# Patient Record
Sex: MALE | Race: WHITE | HISPANIC OR LATINO | URBAN - METROPOLITAN AREA
[De-identification: names, ages, dates, MRNs, and addresses within clinical notes are randomized per-mention and may not be internally consistent; named-entity substitution may affect disease eponyms.]

---

## 2020-04-08 ENCOUNTER — EMERGENCY (EMERGENCY)
Facility: HOSPITAL | Age: 55
LOS: 1 days | Discharge: HOME | End: 2020-04-08
Attending: EMERGENCY MEDICINE | Admitting: EMERGENCY MEDICINE
Payer: MEDICAID

## 2020-04-08 VITALS
DIASTOLIC BLOOD PRESSURE: 78 MMHG | SYSTOLIC BLOOD PRESSURE: 116 MMHG | RESPIRATION RATE: 20 BRPM | TEMPERATURE: 103 F | WEIGHT: 259.93 LBS | HEART RATE: 128 BPM | OXYGEN SATURATION: 93 % | HEIGHT: 71 IN

## 2020-04-08 VITALS
RESPIRATION RATE: 20 BRPM | SYSTOLIC BLOOD PRESSURE: 115 MMHG | OXYGEN SATURATION: 95 % | DIASTOLIC BLOOD PRESSURE: 80 MMHG | TEMPERATURE: 99 F | HEART RATE: 104 BPM

## 2020-04-08 LAB
ALBUMIN SERPL ELPH-MCNC: 3.4 G/DL — SIGNIFICANT CHANGE UP (ref 3.3–5)
ALP SERPL-CCNC: 42 U/L — SIGNIFICANT CHANGE UP (ref 40–120)
ALT FLD-CCNC: 116 U/L — HIGH (ref 12–78)
ANION GAP SERPL CALC-SCNC: 8 MMOL/L — SIGNIFICANT CHANGE UP (ref 5–17)
AST SERPL-CCNC: 94 U/L — HIGH (ref 15–37)
BASOPHILS # BLD AUTO: 0.01 K/UL — SIGNIFICANT CHANGE UP (ref 0–0.2)
BASOPHILS NFR BLD AUTO: 0.1 % — SIGNIFICANT CHANGE UP (ref 0–2)
BILIRUB SERPL-MCNC: 0.4 MG/DL — SIGNIFICANT CHANGE UP (ref 0.2–1.2)
BUN SERPL-MCNC: 15 MG/DL — SIGNIFICANT CHANGE UP (ref 7–23)
CALCIUM SERPL-MCNC: 9.3 MG/DL — SIGNIFICANT CHANGE UP (ref 8.5–10.1)
CHLORIDE SERPL-SCNC: 98 MMOL/L — SIGNIFICANT CHANGE UP (ref 96–108)
CO2 SERPL-SCNC: 28 MMOL/L — SIGNIFICANT CHANGE UP (ref 22–31)
CREAT SERPL-MCNC: 1.1 MG/DL — SIGNIFICANT CHANGE UP (ref 0.5–1.3)
EOSINOPHIL # BLD AUTO: 0 K/UL — SIGNIFICANT CHANGE UP (ref 0–0.5)
EOSINOPHIL NFR BLD AUTO: 0 % — SIGNIFICANT CHANGE UP (ref 0–6)
GLUCOSE SERPL-MCNC: 110 MG/DL — HIGH (ref 70–99)
HCT VFR BLD CALC: 44.8 % — SIGNIFICANT CHANGE UP (ref 39–50)
HGB BLD-MCNC: 15.7 G/DL — SIGNIFICANT CHANGE UP (ref 13–17)
IMM GRANULOCYTES NFR BLD AUTO: 0.3 % — SIGNIFICANT CHANGE UP (ref 0–1.5)
LYMPHOCYTES # BLD AUTO: 1.41 K/UL — SIGNIFICANT CHANGE UP (ref 1–3.3)
LYMPHOCYTES # BLD AUTO: 21 % — SIGNIFICANT CHANGE UP (ref 13–44)
MCHC RBC-ENTMCNC: 31 PG — SIGNIFICANT CHANGE UP (ref 27–34)
MCHC RBC-ENTMCNC: 35 GM/DL — SIGNIFICANT CHANGE UP (ref 32–36)
MCV RBC AUTO: 88.5 FL — SIGNIFICANT CHANGE UP (ref 80–100)
MONOCYTES # BLD AUTO: 0.57 K/UL — SIGNIFICANT CHANGE UP (ref 0–0.9)
MONOCYTES NFR BLD AUTO: 8.5 % — SIGNIFICANT CHANGE UP (ref 2–14)
NEUTROPHILS # BLD AUTO: 4.71 K/UL — SIGNIFICANT CHANGE UP (ref 1.8–7.4)
NEUTROPHILS NFR BLD AUTO: 70.1 % — SIGNIFICANT CHANGE UP (ref 43–77)
NRBC # BLD: 0 /100 WBCS — SIGNIFICANT CHANGE UP (ref 0–0)
PLATELET # BLD AUTO: 250 K/UL — SIGNIFICANT CHANGE UP (ref 150–400)
POTASSIUM SERPL-MCNC: 3.5 MMOL/L — SIGNIFICANT CHANGE UP (ref 3.5–5.3)
POTASSIUM SERPL-SCNC: 3.5 MMOL/L — SIGNIFICANT CHANGE UP (ref 3.5–5.3)
PROT SERPL-MCNC: 7.6 G/DL — SIGNIFICANT CHANGE UP (ref 6–8.3)
RBC # BLD: 5.06 M/UL — SIGNIFICANT CHANGE UP (ref 4.2–5.8)
RBC # FLD: 13.6 % — SIGNIFICANT CHANGE UP (ref 10.3–14.5)
SODIUM SERPL-SCNC: 134 MMOL/L — LOW (ref 135–145)
WBC # BLD: 6.72 K/UL — SIGNIFICANT CHANGE UP (ref 3.8–10.5)
WBC # FLD AUTO: 6.72 K/UL — SIGNIFICANT CHANGE UP (ref 3.8–10.5)

## 2020-04-08 PROCEDURE — 71045 X-RAY EXAM CHEST 1 VIEW: CPT | Mod: 26

## 2020-04-08 PROCEDURE — 85027 COMPLETE CBC AUTOMATED: CPT

## 2020-04-08 PROCEDURE — 36415 COLL VENOUS BLD VENIPUNCTURE: CPT

## 2020-04-08 PROCEDURE — 80053 COMPREHEN METABOLIC PANEL: CPT

## 2020-04-08 PROCEDURE — 87635 SARS-COV-2 COVID-19 AMP PRB: CPT

## 2020-04-08 PROCEDURE — 99283 EMERGENCY DEPT VISIT LOW MDM: CPT

## 2020-04-08 PROCEDURE — 71045 X-RAY EXAM CHEST 1 VIEW: CPT

## 2020-04-08 RX ORDER — SODIUM CHLORIDE 9 MG/ML
1000 INJECTION INTRAMUSCULAR; INTRAVENOUS; SUBCUTANEOUS ONCE
Refills: 0 | Status: COMPLETED | OUTPATIENT
Start: 2020-04-08 | End: 2020-04-08

## 2020-04-08 RX ORDER — ACETAMINOPHEN 500 MG
650 TABLET ORAL ONCE
Refills: 0 | Status: COMPLETED | OUTPATIENT
Start: 2020-04-08 | End: 2020-04-08

## 2020-04-08 RX ADMIN — SODIUM CHLORIDE 1000 MILLILITER(S): 9 INJECTION INTRAMUSCULAR; INTRAVENOUS; SUBCUTANEOUS at 20:23

## 2020-04-08 RX ADMIN — Medication 650 MILLIGRAM(S): at 20:23

## 2020-04-08 NOTE — ED ADULT TRIAGE NOTE - CHIEF COMPLAINT QUOTE
patient came in ED from home with c/o fever, cough, headache, shortness of breath X 12 days. patient stated he completed Z-pack, medrol pack, cough ,medicine X 5 days, no relief.

## 2020-04-08 NOTE — ED PROVIDER NOTE - OBJECTIVE STATEMENT
56 yo male hx of hld, htn c/o fever, cough, headache sob x 2 weeks, taking tylenol, but feels worse, no covid test done, Tmax 103.  No n/v/d. No abdominal pain.  Finished z-pack and medrol dose pack. 56 yo male hx of hld, htn c/o fever, cough, headache sob x 2 weeks, taking tylenol, but feels worse, no covid test done, Tmax 103.  No n/v/d. No abdominal pain.  Finished z-pack and medrol dose pack.    pmd Dr. Dey from [facility]

## 2020-04-08 NOTE — ED ADULT NURSE NOTE - NSIMPLEMENTINTERV_GEN_ALL_ED
Implemented All Universal Safety Interventions:  [place] to call system. Call bell, personal items and telephone within reach. Instruct patient to call for assistance. Room bathroom lighting operational. Non-slip footwear when patient is off stretcher. Physically safe environment: no spills, clutter or unnecessary equipment. Stretcher in lowest position, wheels locked, appropriate side rails in place.

## 2020-04-08 NOTE — ED PROVIDER NOTE - PROGRESS NOTE DETAILS
feels much better, oxygen sat 95% on RA, understands to return if any worsening symptoms.  self isolate until 72 hours after resolution of symptoms

## 2020-04-08 NOTE — ED PROVIDER NOTE - NS ED ROS FT
Constitutional: + Fever, - Chills, - Anorexia, - Fatigue, - Night sweats  Eyes: - Discharge, - Irritation, - Redness, - Visual changes, - Light sensitivity, - Pain  EARS: - Ear Pain, - Tinnitus, - Decreased hearing  NOSE: - Congestion, - Bloody nose  MOUTH/THROAT: - Vocal Changes, - Drooling, - Sore throat  NECK: - Lumps, - Stiffness, - Pain  CV: - Palpitations, - Chest Pain, - Edema, - Syncope  RESP:  +Cough, +Shortness of Breath, - Dyspnea on Exertion, - Trouble speaking, - Pleuritic pain - Wheezing  GI: - Diarrhea, - Constipation, - Bloody stools, - Nausea, - Vomiting, - Abdominal Pain  : - Dysuria, -Frequency, - Hematuria, - Hesitancy, - Incontinence, - Saddle Anesthesia, - Abnormal discharge  MSK: - Myalgias, - Arthralgias, - Weakness, - Deformities, - Injuries  SKIN: - Color change, - Rash, - Swelling, - Ecchymosis, - Abrasion, - laceration  NEURO: - Change in behavior, - Dec. Alertness, - Headache, - Dizziness, - Change in speech, - Weakness, - Seizure-like activity, - Difficulty ambulating

## 2020-04-08 NOTE — ED ADULT NURSE NOTE - OBJECTIVE STATEMENT
fever cough x 14 weeks. states he compeleted course of abx and no improvement. arrives a& ox 3, mildly tachpneic. Spo2 93-95 on room air.

## 2020-04-08 NOTE — ED PROVIDER NOTE - PATIENT PORTAL LINK FT
You can access the FollowMyHealth Patient Portal offered by [facility] by registering at the following website: [email or web address]. By joining [employer]’s FollowMyHealth portal, you will also be able to view your health information using other applications (apps) compatible with our system.

## 2020-04-08 NOTE — ED PROVIDER NOTE - NSFOLLOWUPINSTRUCTIONS_ED_ALL_ED_FT
1) Follow-up with your Primary Medical Doctor or referred doctor.   2) Return if any weakness, fever, or any other concerning/worsening symptoms.  3) See attached instruction sheets for additional information, including information regarding signs and symptoms to look out for, reasons to seek immediate care and other important instructions.  4) Take doxycycline 100mg twice a day for 7 days  5) Tylenol 650mg every 6 hours as needed for fever

## 2020-04-09 LAB — SARS-COV-2 RNA SPEC QL NAA+PROBE: DETECTED

## 2020-05-01 ENCOUNTER — OUTPATIENT (OUTPATIENT)
Dept: OUTPATIENT SERVICES | Facility: HOSPITAL | Age: 55
LOS: 1 days | End: 2020-05-01
Payer: MEDICAID

## 2020-05-01 PROCEDURE — G9005: CPT

## 2020-05-06 DIAGNOSIS — Z71.89 OTHER SPECIFIED COUNSELING: ICD-10-CM

## 2020-06-01 ENCOUNTER — OUTPATIENT (OUTPATIENT)
Dept: OUTPATIENT SERVICES | Facility: HOSPITAL | Age: 55
LOS: 1 days | End: 2020-06-01
Payer: MEDICAID

## 2020-06-02 DIAGNOSIS — Z71.89 OTHER SPECIFIED COUNSELING: ICD-10-CM

## 2021-03-27 ENCOUNTER — TRANSCRIPTION ENCOUNTER (OUTPATIENT)
Age: 56
End: 2021-03-27

## 2021-03-30 PROBLEM — Z00.00 ENCOUNTER FOR PREVENTIVE HEALTH EXAMINATION: Status: ACTIVE | Noted: 2021-03-30

## 2021-04-07 ENCOUNTER — NON-APPOINTMENT (OUTPATIENT)
Age: 56
End: 2021-04-07

## 2021-04-07 ENCOUNTER — APPOINTMENT (OUTPATIENT)
Dept: CARDIOLOGY | Facility: CLINIC | Age: 56
End: 2021-04-07
Payer: MEDICAID

## 2021-04-07 VITALS
SYSTOLIC BLOOD PRESSURE: 135 MMHG | BODY MASS INDEX: 38.92 KG/M2 | TEMPERATURE: 98 F | WEIGHT: 278 LBS | RESPIRATION RATE: 17 BRPM | HEIGHT: 71 IN | HEART RATE: 97 BPM | OXYGEN SATURATION: 98 % | DIASTOLIC BLOOD PRESSURE: 93 MMHG

## 2021-04-07 DIAGNOSIS — Z78.9 OTHER SPECIFIED HEALTH STATUS: ICD-10-CM

## 2021-04-07 DIAGNOSIS — E78.5 HYPERLIPIDEMIA, UNSPECIFIED: ICD-10-CM

## 2021-04-07 DIAGNOSIS — R07.9 CHEST PAIN, UNSPECIFIED: ICD-10-CM

## 2021-04-07 DIAGNOSIS — I10 ESSENTIAL (PRIMARY) HYPERTENSION: ICD-10-CM

## 2021-04-07 PROCEDURE — 93000 ELECTROCARDIOGRAM COMPLETE: CPT | Mod: 59

## 2021-04-07 PROCEDURE — 99204 OFFICE O/P NEW MOD 45 MIN: CPT | Mod: 25

## 2021-04-07 PROCEDURE — 99072 ADDL SUPL MATRL&STAF TM PHE: CPT

## 2021-04-07 PROCEDURE — 93015 CV STRESS TEST SUPVJ I&R: CPT

## 2021-04-12 PROBLEM — E78.5 HYPERLIPIDEMIA: Status: ACTIVE | Noted: 2021-04-12

## 2021-04-12 PROBLEM — Z78.9 NEVER SMOKED TOBACCO: Status: ACTIVE | Noted: 2021-04-12

## 2021-04-12 PROBLEM — R07.9 CHEST PAIN: Status: ACTIVE | Noted: 2021-04-07

## 2021-04-12 PROBLEM — Z78.9 CAFFEINE USE: Status: ACTIVE | Noted: 2021-04-12

## 2021-04-12 PROBLEM — I10 BENIGN HYPERTENSION: Status: ACTIVE | Noted: 2021-04-12

## 2021-04-12 RX ORDER — MELOXICAM 7.5 MG/1
7.5 TABLET ORAL
Refills: 0 | Status: ACTIVE | COMMUNITY

## 2021-04-12 RX ORDER — LISINOPRIL 20 MG/1
20 TABLET ORAL
Refills: 0 | Status: ACTIVE | COMMUNITY

## 2021-04-12 RX ORDER — ATORVASTATIN CALCIUM 80 MG/1
TABLET, FILM COATED ORAL
Refills: 0 | Status: ACTIVE | COMMUNITY

## 2021-04-12 RX ORDER — LOSARTAN POTASSIUM 100 MG/1
TABLET, FILM COATED ORAL
Refills: 0 | Status: ACTIVE | COMMUNITY

## 2021-04-12 NOTE — REASON FOR VISIT
[Consultation] : a consultation regarding [Chest Pain] : chest pain [Coronary Artery Disease] : coronary artery disease [Dyspnea] : dyspnea [FreeTextEntry1] : Trace was seen at the urgent care center March on 27, 2021. He was seen by his doctor 4 months ago but has had change in blood pressure readings. The medication first "was 5 mg and 10 mg now it is 50 mg." The patient was having vague left-sided chest complaints and cough for some amount of time and now shortness of breath but no palpitations.. He never had an echo or stress test.. He comes today for clarification of his  overall cardiovascular risk.He  was advised to undergo a complete cardiac evaluation.He denies current chest pains shortness of breath or loss of consciousnes. He inquires about a potential heart problem\par \par

## 2021-04-12 NOTE — ASSESSMENT
[FreeTextEntry1] : I have asked Trace to undergo detailed cardiac testing in order to evaluate his overall cardiovascular risk.\par An assessment of both structural and functional heart disease was recommended to the patient. In this regard, an echocardiogram and a stress test were advised to the patient along with fasting lipid and a possible cardiac CTA\par I await the upcoming noninvasive cardiac testing in order to assess his overall cardiovascular risk.We discussed the pros and cons of plain treadmill stress testing nuclear stress testing and angiography including a sensitivity analysis.\par \par More than half of the face to face encounter of  45 minutes  was spent in counseling the patient with respect to  cardiovascular risk\par \par Quality measures \par Tobacco intervention that indicated\par Statin for prevention of cardiovascular disease to be determined\par Hypertension compensated\par Aspirin for ischemic vascular disease to be determined\par Tobacco screening cessation and intervention not indicated\par \par Medical necessity\par This is a high encounter based upon two or more chronic illnesses with mild exacerbation requiring further management and evaluation.   .

## 2021-04-12 NOTE — PHYSICAL EXAM
[General Appearance - Well Developed] : well developed [Normal Appearance] : normal appearance [Well Groomed] : well groomed [General Appearance - Well Nourished] : well nourished [No Deformities] : no deformities [General Appearance - In No Acute Distress] : no acute distress [Normal Conjunctiva] : the conjunctiva exhibited no abnormalities [Eyelids - No Xanthelasma] : the eyelids demonstrated no xanthelasmas [Normal Oral Mucosa] : normal oral mucosa [No Oral Pallor] : no oral pallor [No Oral Cyanosis] : no oral cyanosis [Normal Jugular Venous A Waves Present] : normal jugular venous A waves present [Normal Jugular Venous V Waves Present] : normal jugular venous V waves present [No Jugular Venous Chapman A Waves] : no jugular venous chapman A waves [Heart Rate And Rhythm] : heart rate and rhythm were normal [Heart Sounds] : normal S1 and S2 [Murmurs] : no murmurs present [Respiration, Rhythm And Depth] : normal respiratory rhythm and effort [Exaggerated Use Of Accessory Muscles For Inspiration] : no accessory muscle use [Auscultation Breath Sounds / Voice Sounds] : lungs were clear to auscultation bilaterally [Abdomen Soft] : soft [Abdomen Tenderness] : non-tender [Abdomen Mass (___ Cm)] : no abdominal mass palpated [Abnormal Walk] : normal gait [Gait - Sufficient For Exercise Testing] : the gait was sufficient for exercise testing [Nail Clubbing] : no clubbing of the fingernails [Cyanosis, Localized] : no localized cyanosis [Petechial Hemorrhages (___cm)] : no petechial hemorrhages [Skin Color & Pigmentation] : normal skin color and pigmentation [] : no rash [No Venous Stasis] : no venous stasis [Skin Lesions] : no skin lesions [No Skin Ulcers] : no skin ulcer [No Xanthoma] : no  xanthoma was observed [Oriented To Time, Place, And Person] : oriented to person, place, and time [Affect] : the affect was normal [Mood] : the mood was normal [No Anxiety] : not feeling anxious

## 2021-04-14 ENCOUNTER — APPOINTMENT (OUTPATIENT)
Dept: CT IMAGING | Facility: CLINIC | Age: 56
End: 2021-04-14

## 2021-04-23 PROBLEM — E78.5 HYPERLIPIDEMIA, UNSPECIFIED: Chronic | Status: ACTIVE | Noted: 2020-04-08

## 2021-04-23 PROBLEM — I10 ESSENTIAL (PRIMARY) HYPERTENSION: Chronic | Status: ACTIVE | Noted: 2020-04-08

## 2021-05-01 NOTE — ED PROVIDER NOTE - CONDITION AT DISCHARGE:
Alert-The patient is alert, awake and responds to voice. The patient is oriented to time, place, and person. The triage nurse is able to obtain subjective information.
Improved

## 2021-05-05 ENCOUNTER — APPOINTMENT (OUTPATIENT)
Dept: CARDIOLOGY | Facility: CLINIC | Age: 56
End: 2021-05-05
Payer: MEDICAID

## 2021-05-05 PROCEDURE — 93306 TTE W/DOPPLER COMPLETE: CPT

## 2021-05-05 PROCEDURE — 99072 ADDL SUPL MATRL&STAF TM PHE: CPT

## 2021-05-06 ENCOUNTER — APPOINTMENT (OUTPATIENT)
Dept: CT IMAGING | Facility: CLINIC | Age: 56
End: 2021-05-06

## 2021-12-01 PROCEDURE — G9005: CPT

## 2021-12-16 ENCOUNTER — TRANSCRIPTION ENCOUNTER (OUTPATIENT)
Age: 56
End: 2021-12-16

## 2022-01-02 ENCOUNTER — TRANSCRIPTION ENCOUNTER (OUTPATIENT)
Age: 57
End: 2022-01-02

## 2023-11-21 ENCOUNTER — OFFICE (OUTPATIENT)
Dept: URBAN - METROPOLITAN AREA CLINIC 109 | Facility: CLINIC | Age: 58
Setting detail: OPHTHALMOLOGY
End: 2023-11-21
Payer: MEDICARE

## 2023-11-21 DIAGNOSIS — H18.413: ICD-10-CM

## 2023-11-21 DIAGNOSIS — H52.7: ICD-10-CM

## 2023-11-21 DIAGNOSIS — H01.001: ICD-10-CM

## 2023-11-21 DIAGNOSIS — H43.393: ICD-10-CM

## 2023-11-21 DIAGNOSIS — H01.002: ICD-10-CM

## 2023-11-21 PROBLEM — H35.54 RPE CHANGES ; RIGHT EYE: Status: ACTIVE | Noted: 2023-11-21

## 2023-11-21 PROBLEM — H01.005 BLEPHARITIS; RIGHT UPPER LID, RIGHT LOWER LID, LEFT UPPER LID, LEFT LOWER LID: Status: ACTIVE | Noted: 2023-11-21

## 2023-11-21 PROBLEM — E11.9 DIABETES TYPE 2 NO RETINOPATHY ; BOTH EYES: Status: ACTIVE | Noted: 2023-11-21

## 2023-11-21 PROBLEM — H11.042 PTERYGIUM-PERIPHERAL; LEFT EYE: Status: ACTIVE | Noted: 2023-11-21

## 2023-11-21 PROBLEM — H01.004 BLEPHARITIS; RIGHT UPPER LID, RIGHT LOWER LID, LEFT UPPER LID, LEFT LOWER LID: Status: ACTIVE | Noted: 2023-11-21

## 2023-11-21 PROBLEM — H11.153 PINGUECULA; BOTH EYES: Status: ACTIVE | Noted: 2023-11-21

## 2023-11-21 PROCEDURE — 92015 DETERMINE REFRACTIVE STATE: CPT | Performed by: OPHTHALMOLOGY

## 2023-11-21 PROCEDURE — 92004 COMPRE OPH EXAM NEW PT 1/>: CPT | Performed by: OPHTHALMOLOGY

## 2023-11-21 ASSESSMENT — REFRACTION_MANIFEST
OD_SPHERE: +2.50
OD_CYLINDER: -1.50
OS_AXIS: 90
OD_AXIS: 80
OS_VA1: 20/20
OS_ADD: +2.00
OD_CYLINDER: -1.00
OS_CYLINDER: -1.25
OS_SPHERE: +2.50
OD_VA1: 20/20
OD_ADD: +2.00
OS_SPHERE: +2.25
OS_AXIS: 90
OD_AXIS: 80
OD_SPHERE: +2.25
OS_CYLINDER: -1.75

## 2023-11-21 ASSESSMENT — REFRACTION_AUTOREFRACTION
OS_AXIS: 092
OD_CYLINDER: -1.50
OS_CYLINDER: -1.75
OD_SPHERE: +2.50
OD_AXIS: 081
OS_SPHERE: +2.50

## 2023-11-21 ASSESSMENT — LID EXAM ASSESSMENTS
OD_BLEPHARITIS: RLL RUL 1+
OS_BLEPHARITIS: LLL LUL 1+

## 2023-11-21 ASSESSMENT — SPHEQUIV_DERIVED
OD_SPHEQUIV: 1.75
OS_SPHEQUIV: 1.625

## 2023-11-21 ASSESSMENT — CONFRONTATIONAL VISUAL FIELD TEST (CVF)
OS_FINDINGS: FULL
OD_FINDINGS: FULL

## 2023-11-21 ASSESSMENT — CORNEAL PTERYGIUM: OS_PTERYGIUM: NASAL 1MM

## 2024-02-22 ENCOUNTER — APPOINTMENT (OUTPATIENT)
Dept: ORTHOPEDIC SURGERY | Facility: CLINIC | Age: 59
End: 2024-02-22
Payer: MEDICAID

## 2024-02-22 VITALS — BODY MASS INDEX: 38.92 KG/M2 | HEIGHT: 71 IN | WEIGHT: 278 LBS

## 2024-02-22 DIAGNOSIS — M75.102 UNSPECIFIED ROTATOR CUFF TEAR OR RUPTURE OF LEFT SHOULDER, NOT SPECIFIED AS TRAUMATIC: ICD-10-CM

## 2024-02-22 DIAGNOSIS — R73.03 PREDIABETES.: ICD-10-CM

## 2024-02-22 DIAGNOSIS — M25.511 PAIN IN RIGHT SHOULDER: ICD-10-CM

## 2024-02-22 DIAGNOSIS — M75.42 IMPINGEMENT SYNDROME OF RIGHT SHOULDER: ICD-10-CM

## 2024-02-22 DIAGNOSIS — M75.41 IMPINGEMENT SYNDROME OF RIGHT SHOULDER: ICD-10-CM

## 2024-02-22 DIAGNOSIS — M75.101 UNSPECIFIED ROTATOR CUFF TEAR OR RUPTURE OF RIGHT SHOULDER, NOT SPECIFIED AS TRAUMATIC: ICD-10-CM

## 2024-02-22 DIAGNOSIS — M25.512 PAIN IN LEFT SHOULDER: ICD-10-CM

## 2024-02-22 PROCEDURE — 99204 OFFICE O/P NEW MOD 45 MIN: CPT

## 2024-02-22 PROCEDURE — 73030 X-RAY EXAM OF SHOULDER: CPT | Mod: 50

## 2024-02-22 PROCEDURE — 72040 X-RAY EXAM NECK SPINE 2-3 VW: CPT

## 2024-02-22 RX ORDER — METHYLPREDNISOLONE 4 MG/1
4 TABLET ORAL
Qty: 1 | Refills: 0 | Status: ACTIVE | COMMUNITY
Start: 2024-02-22 | End: 1900-01-01

## 2024-02-29 NOTE — IMAGING
[de-identified] : The patient is a well appearing 58 year year old male of their stated age. Neck is supple & nontender to palpation. Negative Spurling's test.       General: in no acute distress, seated comfortably, moving easily   Skin: No discoloration, rashes; on palpation skin is dry,   Neuro: Normal sensation all dermatomes, motor all myotomes   Vascular: Normal pulses, no edema, normal temperature   Coordination and balance: Normal   Psych: normal mood and affect, non pressured speech, alert and oriented x3       RIGHT Shoulder   Inspection:   Scapula Winging: Negative   Deformity: None   Erythema: None   Ecchymosis: None   Abrasions: None   Effusion: None       Range of Motion:   Active Forward Flexion: 160 degrees   Passive Forward Flexion: 170 degrees   Active IR : L4   Passive ER : 30 degrees       Motor Exam:   Forward Flexion: 4+ out of 5   Flexion Plane of Scapula: 5 out of 5   Abduction: 4+ out of 5   Internal Rotation: 5 out of 5   External Rotation: 4+ out of 5   Distal Motor Strength: 5 out of 5       Stability Testing:   Anterior: 1+   Posterior: 1+   Sulcus N: 1+   Sulcus ER: 1+       Provocative Tests:   Drop Arm: Negative   Medina/Impingement: Positive   Dubois: Positive   X-Arm Adduction: Negative   Belly Press: Negative   Bear Hug: Negative   Lift Off: Negative   Apprehension: Negative   Relocation: Negative   Posterior Load & Shift: Negative       Palpation:   AC Joint: TTP   Clavicle: Nontender   SC Joint: Nontender   Bicipital Groove: TTP   Coracoid Process: Nontender   Pectoralis Minor Tendon: Nontender   Pectoralis Major Tendon: Nontender & palpably intact   Latissimus Dorsi: Nontender   Proximal Humerus: Nontender   Scapula Body: Nontender   Medial Scapula Border: Nontender   Scapula Spine: Nontender       Neurologic Exam: Sensation to Light Touch:   Axillary: Grossly intact   Ulnar: Grossly intact   Radial: Grossly intact   Median: Grossly intact       Circulatory/Pulses:   Ulnar: 2+   Radial: 2+   CR < 2s  ------  LEFT Shoulder   Inspection:   Scapula Winging: Negative   Deformity: None   Erythema: None   Ecchymosis: None   Abrasions: None   Effusion: None       Range of Motion:   Active Forward Flexion: 160 degrees   Passive Forward Flexion: 170 degrees   Active IR : L4   Passive ER : 30 degrees       Motor Exam:   Forward Flexion: 4+ out of 5   Flexion Plane of Scapula: 5 out of 5   Abduction: 4+ out of 5   Internal Rotation: 5 out of 5   External Rotation: 4+ out of 5   Distal Motor Strength: 5 out of 5       Stability Testing:   Anterior: 1+   Posterior: 1+   Sulcus N: 1+   Sulcus ER: 1+       Provocative Tests:   Drop Arm: Negative   Medina/Impingement: Positive   Dubois: Positive   X-Arm Adduction: Negative   Belly Press: Negative   Bear Hug: Negative   Lift Off: Negative   Apprehension: Negative   Relocation: Negative   Posterior Load & Shift: Negative       Palpation:   AC Joint: TTP   Clavicle: Nontender   SC Joint: Nontender   Bicipital Groove: TTP   Coracoid Process: Nontender   Pectoralis Minor Tendon: Nontender   Pectoralis Major Tendon: Nontender & palpably intact   Latissimus Dorsi: Nontender   Proximal Humerus: Nontender   Scapula Body: Nontender   Medial Scapula Border: Nontender   Scapula Spine: Nontender       Neurologic Exam: Sensation to Light Touch:   Axillary: Grossly intact   Ulnar: Grossly intact   Radial: Grossly intact   Median: Grossly intact       Circulatory/Pulses:   Ulnar: 2+   Radial: 2+   CR < 2s        X-RAYS of the RIGHT shoulder (AP, SCAPULAR Y, AND AXILLARY VIEWS): no fracture or dislocation; Bigliani Type 2 acromion; mild degenerative change at ACJ; subacromial spurring  X-RAYS of the LEFT shoulder (AP, SCAPULAR Y, AND AXILLARY VIEWS): no fracture or dislocation; Bigliani Type 2 acromion; mild degenerative change at ACJ; subacromial spurring  X-RAYS of the CERVICAL SPINE (AP, LATERAL): no fracture or dislocation
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
## 2024-02-29 NOTE — DISCUSSION/SUMMARY
[de-identified] : Assessment: The patient is a 57 year old female with BILATERAL SHOULDER PAIN AND POSSIBLE ADHESIVE CAPSULITIS.  Patient and I discussed their symptoms. Discussed findings of today's exam and possible causes of patient's pain. Educated patient on their most probable diagnosis. Reviewed possible courses of treatment, and we collaboratively decided best course of treatment at this time will include:  1. Start MDP  2. PT   The patient's current medication management of their orthopedic diagnosis was reviewed today:   (1) We discussed a comprehensive treatment plan that included possible pharmaceutical management involving the use of prescription strength medications including but not limited to options such as oral Naprosyn 500mg BID, once daily Meloxicam 15 mg, or 500-650 mg Tylenol versus over the counter oral medications and topical prescription NSAID Pennsaid vs over the counter Voltaren gel.   (2) There is a moderate risk of morbidity with further treatment, especially from use of prescription strength medications and possible side effects of these medications which include upset stomach with oral medications, skin reactions to topical medications and cardiac/renal issues with long term use.   (3) I recommended that the patient follow-up with their medical physician to discuss any significant specific potential issues with long term medication use such as interactions with current medications or with exacerbation of underlying medical comorbidities.   (4) The benefits and risks associated with use of injectable, oral or topical, prescription and over the counter anti-inflammatory medications were discussed with the patient. The patient voiced understanding of the risks including but not limited to bleeding, stroke, kidney dysfunction, heart disease, and were referred to the black box warning label for further information.  Follow up in 6 WEEKS

## 2024-09-18 ENCOUNTER — NON-APPOINTMENT (OUTPATIENT)
Age: 59
End: 2024-09-18